# Patient Record
Sex: FEMALE | Race: WHITE
[De-identification: names, ages, dates, MRNs, and addresses within clinical notes are randomized per-mention and may not be internally consistent; named-entity substitution may affect disease eponyms.]

---

## 2017-06-02 ENCOUNTER — HOSPITAL ENCOUNTER (EMERGENCY)
Dept: HOSPITAL 10 - FTE | Age: 32
Discharge: HOME | End: 2017-06-02
Payer: COMMERCIAL

## 2017-06-02 VITALS
HEIGHT: 63 IN | WEIGHT: 277.78 LBS | BODY MASS INDEX: 49.22 KG/M2 | BODY MASS INDEX: 49.22 KG/M2 | WEIGHT: 277.78 LBS | HEIGHT: 63 IN

## 2017-06-02 DIAGNOSIS — R11.0: ICD-10-CM

## 2017-06-02 DIAGNOSIS — R10.2: ICD-10-CM

## 2017-06-02 DIAGNOSIS — J45.909: ICD-10-CM

## 2017-06-02 DIAGNOSIS — R30.0: Primary | ICD-10-CM

## 2017-06-02 PROCEDURE — 76856 US EXAM PELVIC COMPLETE: CPT

## 2017-06-02 PROCEDURE — 76830 TRANSVAGINAL US NON-OB: CPT

## 2017-06-02 PROCEDURE — 81003 URINALYSIS AUTO W/O SCOPE: CPT

## 2017-06-02 NOTE — ERD
ER Documentation


Chief Complaint


Date/Time


DATE: 17 


TIME: 14:59


Chief Complaint


PAINFUL URINATION,VAGINAL PAIN





HPI


Patient is a 31-year-old female who presents to the ED with dysuria and pelvic 

pain and nausea on and off for 1 week.  She states that she has not gotten her 

menstrual cycle for the last 3 months usually her menstrual cycles are every 

month.  Denies vaginal bleeding or abnormal vaginal discharge.  States that she 

is currently sexually active with one partner.  Does not use protection.  

Denies vomiting or diarrhea.  Last bowel movement was today.  Denies headache 

or dizziness.  Denies chest pain or cough or shortness of breath or difficulty 

breathing.  Denies leg pain or leg swelling.  Denies recent travel or recent 

surgeries.  No history of DVT or clots.  Patient states that she might be 

pregnant and wants a pregnancy test.





ROS


All systems reviewed and are negative except as per history of present illness.





Medications


Home Meds


Active Scripts


Ondansetron (Ondansetron Odt) 4 Mg Tab.rapdis, 4 MG PO Q6H Y for NAUSEA AND/OR 

VOMITING, #10 TAB


   Prov:DENNYS JAMA PA-C         17


Phenazopyridine Hcl* (Pyridium*) 100 Mg Tab, 100 MG PO TID Y for URINARY PAIN, #

8 TAB


   Prov:DENNYS JAMA PA-C         17


Ciprofloxacin Hcl/Dexameth (Ciprodex Otic Suspension) 7.5 Ml Drops.susp, 4 DROP 

LEFT EAR BID for 7 Days, EA


   Prov:PAMELA BRANTLEY PA-C         7/13/15


Amoxicillin* (Amoxicillin*) 500 Mg Cap, 500 MG PO TID for 7 Days, CAP


   Prov:PAMELA BRANTLEY PA-C         7/13/15


Ibuprofen* (Motrin*) 600 Mg Tab, 600 MG PO Q6, #14 TAB


   Prov:PAMELA BRANTLEY PA-C         7/13/15


Tramadol HCl (Tramadol HCl) 50 Mg Tab, 50 MG PO Q4 Y for PAIN, #14 TAB


   Prov:PAMELA BRANTLEY PA-C         7/13/15





Allergies


Allergies:  


Coded Allergies:  


     No Known Allergy (Unverified , 7/13/15)





PMhx/Soc


Medical and Surgical Hx:  pt denies Surgical Hx


History of Surgery:  No


Anesthesia Reaction:  No


Hx Neurological Disorder:  No


Hx Respiratory Disorders:  Yes (ASTHMA)


Hx Cardiac Disorders:  No


Hx Psychiatric Problems:  No


Hx Miscellaneous Medical Probl:  No


Hx Alcohol Use:  No


Hx Substance Use:  No


Hx Tobacco Use:  No


Smoking Status:  Never smoker





FmHx


Family History:  No coronary disease, No diabetes, No other





Physical Exam


Vitals





Vital Signs








  Date Time  Temp Pulse Resp B/P Pulse Ox O2 Delivery O2 Flow Rate FiO2


 


17 11:59 98.0 80 18 137/69 98   








Physical Exam





GENERAL: Well-developed, well-nourished female. Appears in no acute distress. 


HEAD: Normocephalic, atraumatic. 


EYES: Pupils are equally reactive bilaterally. EOMs grossly intact. No 

conjunctival erythema. 


ENT: Moist mucous membranes. No uvula deviation. No kissing tonsils. No 

exudates. 


NECK: Supple. No lymphadenopathy or thyromegaly. No meningismus. negative 

kernig. negative brudinski.  


LUNG: Clear to auscultation bilaterally. No rhonchi, wheezing, rales or coarse 

breath sounds. 


HEART: Regular rate and rhythm. No murmurs, rubs or gallops.


ABDOMEN: No scars, ecchymosis or rashes noted. Soft, nontender, and 

nondistended. Positive bowel sounds in all four quadrants. No rebound tenderness

, no guarding. (-) McBurneys point tenderness. No CVA tenderness.  Generalized 

tenderness.  No focal tenderness.


BACK: No midline tenderness. 


Extremities: Equal pulses bilaterally. No peripheral clubbing, cyanosis or 

edema. No unilateral leg swelling.


NEUROLOGIC: Alert and oriented. Moving all four extremities. 5/5 strength in 

all extremities. Normal speech. Steady gait. 


SKIN: Normal color. Warm and dry. No rashes or lesions. Capillary refill < 2 

seconds


Results 24 hrs





 Laboratory Tests








Test


  17


14:06


 


Bedside Urine pH (LAB) 6.5 


 


Bedside Urine Protein (LAB) Negative 


 


Bedside Urine Glucose (UA) Negative 


 


Bedside Urine Ketones (LAB) Negative 


 


Bedside Urine Blood Negative 


 


Bedside Urine Nitrite (LAB) Negative 


 


Bedside Urine Leukocyte


Esterase (L Negative 


 








 Current Medications








 Medications


  (Trade)  Dose


 Ordered  Sig/Loretta


 Route


 PRN Reason  Start Time


 Stop Time Status Last Admin


Dose Admin


 


 Ondansetron HCl


  (Zofran Odt)  4 mg  ONCE  STAT


 ODT


   17 13:41


 17 13:43 DC 17 14:26


 


 


 Acetaminophen


  (Tylenol Tab)  650 mg  ONCE  ONCE


 PO


   17 14:00


 17 14:01 DC 17 14:26


 











Procedures/MDM


ER COURSE:


I kept the patient and/or family informed of laboratory and diagnostic imaging 

results throughout the emergency room course.





IMAGING STUDIES


 [place] [phone] [place] [id number]


 Radiology Main Line: [phone]





 DIAGNOSTIC IMAGING REPORT





 Patient: MARCIA LARA   : 1985   Age: 31  Sex: F        

                


 MR #:    [id number]   Acct #:   [id number]    DOS: 17 1341


 Ordering MD: DENNYS JAMA PA-C   Location:  FTE   Room/Bed:           

                                 


 








PROCEDURE:   US Pelvis 


 


CLINICAL INDICATION:   Pelvic pain 


 


TECHNIQUE:   Sonographic evaluation of the pelvis was performed utilizing both 

transabdominal and transvaginal technique.  Curved array transabdominal 

transducer technique as well as a high frequency endovaginal probe was 

utilized. Images were reviewed on the high-resolution PACS workstation. 


 


COMPARISON:   None available


 


FINDINGS:


 


The uterus is normal in size, echogenicity, and morphology measuring 9.7 x 4.5 

x 4.8 cm in dimension.  The uterus is anteverted in normal position.   The 

endometrium measures 9.3 mm in diameter.  The normal trilaminar stripe of the 

endometrium is preserved.    


 


The right ovary measures 3.5 x 2.1 x 2.2 cm in dimension.  The left ovary 

measures 5.5 x 4.2 x 4.5 cm in dimension, and demonstrates a 4.1 cm minimally 

complex cyst, likely hemorrhagic cyst.  No ovarian torsion, adnexal mass or 

pelvic free fluid is identified.  


 


IMPRESSION:


 


1.  Minimally complex 4.1 cm left ovarian cyst, likely hemorrhagic cyst.


2.  Otherwise unremarkable ultrasound exam.     


 


RPTAT: HH


_____________________________________________ 


.Zhou Jeffery MD, MD           Date    Time 


Electronically viewed and signed by .Zhou Jeffery MD, MD on 2017 14:

33 


 


D:  2017 14:33  T:  2017 14:33


.R/





CC: DENNYS JAMA PA-C





LABORATORY STUDIES


Urine dip was negative for nitrites, hematuria or leukocytes.  Negative 

pregnancy test








MEDICAL DECISION MAKING:


This is a 31-year-old female who presents with dysuria and pelvic pain and no 

periods for 3 months. Vital signs were reviewed. Patient is afebrile. Patient 

is not hypoxic.  She is not toxic or ill-appearing.  Her ultrasound is read by 

radiologist shows a ovarian cyst.  Low suspicion for ovarian torsion.  Urine 

was negative for infection.  And her pregnancy test was negative.  Low 

suspicion for ovarian torsion, PID, tuboovarian abscess, ectopic pregnancy, 

bowel obstruction, pyelonephritis, UTI, appendicitis, cervicitis, septic 

, molar pregnancy.  Low suspicion for pyelonephritis.  Patient does not 

have vaginal bleeding and I did not think a blood work was necessary at this 

time.  Low suspicion for anemia.








DISCHARGE:


At this time, patient is stable for discharge and outpatient management with no 

new complaints during the ER course. Patient was sent home with Zofran and 

Pyridium and a copy of all imaging reports.  Patient will be discharged home 

with instructions to recheck for new or worsening symptoms such as fever, nausea

, weakness, LOC and to follow up with primary care in the next 1-2 days. 

Patient was advised to return to the ER for any new or worsening symptoms. Plan 

was discussed and patient and/or family understands and agrees. Home 

instructions were given.





Departure


Diagnosis:  


 Primary Impression:  


 Dysuria


Condition:  Stable


Patient Instructions:  What Are Ovarian Cysts?, Ovarian Cyst


Referrals:  


[facility]


Usted se ha hecho un examen mdico de control que le indica que no est en symone 

condicin que requiera tratamiento urgente en el Departamento de Emergencia. Un 

estudio ms profundo y el tratamiento de holden condicin pueden esperar sin ningn 

riesgo hasta que usted sea atendida/o en el consultorio de holden mdico o symone cl

missael. Es responsabilidad suya arreglar symone rosie para el seguimiento del rosa. 





MANEJO DE CONDICIONES NO URGENTES EN EL FUTURO


1) Si usted tiene un mdico de atencin primaria:





Usted debera llamar a holden mdico de atencin primaria antes de venir al 

departamento de emergencia. Despus de las horas de consultorio, holden doctor o holden 

asociado/a est disponible por telfono. El mdico o enfermero de denise en el 

servicio telefnico puede asesorarle por savannah medio para atender el problema, o 

rosa contrario se puede programar symone rosie.





2) Si usted no tiene un mdico de atencin primaria:


Llame al mdico o clnica de referencia que aparece abajo randy las horas de 

consultorio para hacer symone rosie para que le vean.





CLINICAS:


[place] [address] [phone] [facility]  [phone] [address] [facility] [phone] [address] [facility] [place] [address] [phone] [place] [phone] [address] [facility]. [phone] 


[address]





Additional Instructions:  


Llame al doctor MAANA y matilde symone ROSIE PARA DENTRO DE 1-2 LOPEZ.Dgale a la 

secretaria que nosotros le instruimos hacer esta rosie.Avise o llame si holden 

condicin se empeora antes de la rosie. Regresa aqui si peor o no mejor.











DENNYS JAMA PA-C 2017 15:03

## 2017-06-02 NOTE — RADRPT
PROCEDURE:   US Pelvis 

 

CLINICAL INDICATION:   Pelvic pain 

 

TECHNIQUE:   Sonographic evaluation of the pelvis was performed utilizing both transabdominal and tr
ansvaginal technique.  Curved array transabdominal transducer technique as well as a high frequency 
endovaginal probe was utilized. Images were reviewed on the high-resolution PACS workstation. 

 

COMPARISON:   None available

 

FINDINGS:

 

The uterus is normal in size, echogenicity, and morphology measuring 9.7 x 4.5 x 4.8 cm in dimension
.  The uterus is anteverted in normal position.   The endometrium measures 9.3 mm in diameter.  The 
normal trilaminar stripe of the endometrium is preserved.    

 

The right ovary measures 3.5 x 2.1 x 2.2 cm in dimension.  The left ovary measures 5.5 x 4.2 x 4.5 c
m in dimension, and demonstrates a 4.1 cm minimally complex cyst, likely hemorrhagic cyst.  No ovari
an torsion, adnexal mass or pelvic free fluid is identified.  

 

IMPRESSION:

 

1.  Minimally complex 4.1 cm left ovarian cyst, likely hemorrhagic cyst.

2.  Otherwise unremarkable ultrasound exam.     

 

RPTAT: HH

_____________________________________________ 

.Zhou Jeffery MD, MD           Date    Time 

Electronically viewed and signed by .Zhou Jeffery MD, MD on 06/02/2017 14:33 

 

D:  06/02/2017 14:33  T:  06/02/2017 14:33

.R/

## 2018-01-31 ENCOUNTER — HOSPITAL ENCOUNTER (EMERGENCY)
Age: 33
Discharge: HOME | End: 2018-01-31

## 2018-01-31 ENCOUNTER — HOSPITAL ENCOUNTER (EMERGENCY)
Dept: HOSPITAL 91 - FTE | Age: 33
Discharge: HOME | End: 2018-01-31
Payer: MEDICAID

## 2018-01-31 DIAGNOSIS — J45.909: ICD-10-CM

## 2018-01-31 DIAGNOSIS — N30.00: Primary | ICD-10-CM

## 2018-01-31 LAB
ADD UMIC: YES
UR ASCORBIC ACID: NEGATIVE MG/DL
UR BACTERIA: (no result) /HPF
UR BILIRUBIN (DIP): NEGATIVE MG/DL
UR BLOOD (DIP): NEGATIVE MG/DL
UR CLARITY: (no result)
UR COLOR: YELLOW
UR GLUCOSE (DIP): NEGATIVE MG/DL
UR KETONES (DIP): NEGATIVE MG/DL
UR LEUKOCYTE ESTERASE (DIP): (no result) LEU/UL
UR MUCUS: (no result) /HPF
UR NITRITE (DIP): NEGATIVE MG/DL
UR PH (DIP): 6 (ref 5–9)
UR RBC: 1 /HPF (ref 0–5)
UR SPECIFIC GRAVITY (DIP): 1.02 (ref 1–1.03)
UR SQUAMOUS EPITHELIAL CELL: (no result) /HPF
UR TOTAL PROTEIN (DIP): NEGATIVE MG/DL
UR UROBILINOGEN (DIP): NEGATIVE MG/DL
UR WBC: 21 /HPF (ref 0–5)

## 2018-01-31 PROCEDURE — 81001 URINALYSIS AUTO W/SCOPE: CPT

## 2018-01-31 PROCEDURE — 76830 TRANSVAGINAL US NON-OB: CPT

## 2018-01-31 PROCEDURE — 99284 EMERGENCY DEPT VISIT MOD MDM: CPT

## 2018-01-31 PROCEDURE — 87086 URINE CULTURE/COLONY COUNT: CPT

## 2018-01-31 PROCEDURE — 76856 US EXAM PELVIC COMPLETE: CPT

## 2018-01-31 RX ADMIN — IBUPROFEN 1 MG: 800 TABLET, FILM COATED ORAL at 11:23
